# Patient Record
Sex: FEMALE
[De-identification: names, ages, dates, MRNs, and addresses within clinical notes are randomized per-mention and may not be internally consistent; named-entity substitution may affect disease eponyms.]

---

## 2018-03-21 ENCOUNTER — HOSPITAL ENCOUNTER (EMERGENCY)
Dept: HOSPITAL 5 - ED | Age: 33
Discharge: HOME | End: 2018-03-21
Payer: COMMERCIAL

## 2018-03-21 VITALS — DIASTOLIC BLOOD PRESSURE: 53 MMHG | SYSTOLIC BLOOD PRESSURE: 110 MMHG

## 2018-03-21 DIAGNOSIS — N93.8: Primary | ICD-10-CM

## 2018-03-21 DIAGNOSIS — Z98.51: ICD-10-CM

## 2018-03-21 DIAGNOSIS — F17.200: ICD-10-CM

## 2018-03-21 DIAGNOSIS — Z90.49: ICD-10-CM

## 2018-03-21 LAB
BASOPHILS # (AUTO): 0 K/MM3 (ref 0–0.1)
BASOPHILS NFR BLD AUTO: 0.3 % (ref 0–1.8)
EOSINOPHIL # BLD AUTO: 0.2 K/MM3 (ref 0–0.4)
EOSINOPHIL NFR BLD AUTO: 2.1 % (ref 0–4.3)
HCT VFR BLD CALC: 35.6 % (ref 30.3–42.9)
HGB BLD-MCNC: 11.8 GM/DL (ref 10.1–14.3)
LYMPHOCYTES # BLD AUTO: 3.4 K/MM3 (ref 1.2–5.4)
LYMPHOCYTES NFR BLD AUTO: 35.3 % (ref 13.4–35)
MCH RBC QN AUTO: 30 PG (ref 28–32)
MCHC RBC AUTO-ENTMCNC: 33 % (ref 30–34)
MCV RBC AUTO: 91 FL (ref 79–97)
MONOCYTES # (AUTO): 0.7 K/MM3 (ref 0–0.8)
MONOCYTES % (AUTO): 7.2 % (ref 0–7.3)
PLATELET # BLD: 342 K/MM3 (ref 140–440)
RBC # BLD AUTO: 3.9 M/MM3 (ref 3.65–5.03)

## 2018-03-21 PROCEDURE — 99283 EMERGENCY DEPT VISIT LOW MDM: CPT

## 2018-03-21 PROCEDURE — 84703 CHORIONIC GONADOTROPIN ASSAY: CPT

## 2018-03-21 PROCEDURE — 85025 COMPLETE CBC W/AUTO DIFF WBC: CPT

## 2018-03-21 PROCEDURE — 36415 COLL VENOUS BLD VENIPUNCTURE: CPT

## 2018-03-21 NOTE — EMERGENCY DEPARTMENT REPORT
Blank Doc





- Documentation


Documentation: 





Patient is a 32-year-old  female said 8 days of heavy vaginal 

bleeding.  Patient states that she is bleeding to the point where there is 

multiple clots coming out.  Patient states she has some suprapubic and left 

lower quadrant discomfort as well.  Patient states she is normally very regular 

and not had any leading to this level.  Patient's pain is 7 out of 10 in 

severity.  It is a sharp aching pain.  Patient denies any chest pain shortness 

of breath and fatigue.  Patient states she doesn't have another appointment 

with her OB/GYN for 2 weeks.  Patient's OB/GYN sent her here today for 

evaluation.

## 2018-03-21 NOTE — EMERGENCY DEPARTMENT REPORT
ED Female  HPI





- General


Chief complaint: Vaginal Bleeding


Stated complaint: VAG BLEEDING


Time Seen by Provider: 18 17:14


Source: patient


Mode of arrival: Ambulatory


Limitations: No Limitations





- History of Present Illness


Initial comments: 


Patient is a 32-year-old  female said 8 days of heavy vaginal 

bleeding.  Patient states that she is bleeding to the point where there is 

multiple clots coming out.  Patient states she has some suprapubic and left 

lower quadrant discomfort as well.  Patient states she is normally very regular 

and not had any leading to this level.  Patient's pain is 7 out of 10 in 

severity.  It is a sharp aching pain.  Patient denies any chest pain shortness 

of breath and fatigue.  Patient states she doesn't have another appointment 

with her OB/GYN for 2 weeks.  Patient's OB/GYN sent her here today for 

evaluation.








- Related Data


 Previous Rx's











 Medication  Instructions  Recorded  Last Taken  Type


 


HYDROcodone/APAP 5-325 [[place] 1 each PO Q4HR PRN #12 tablet 18 Unknown Rx





5/325]    


 


Ibuprofen [Motrin] 600 mg PO Q8H PRN #20 tablet 18 Unknown Rx


 


medroxyPROGESTERone ACETATE 10 mg PO DAILY #5 tablet 18 Unknown Rx





[Provera]    











 Allergies











Allergy/AdvReac Type Severity Reaction Status Date / Time


 


No Known Allergies Allergy   Unverified 18 14:41














ED Review of Systems


ROS: 


Stated complaint: VAG BLEEDING


Other details as noted in HPI





Comment: All other systems reviewed and negative





ED Past Medical Hx





- Past Medical History


Previous Medical History?: Yes


Additional medical history: Heavy menstrual cycles





- Surgical History


Past Surgical History?: Yes


Hx Cholecystectomy: Yes


Additional Surgical History: Leep procedure, Tubaligation,  x 2





- Social History


Smoking Status: Current Every Day Smoker


Substance Use Type: Alcohol, Non Opiate Pain





- Medications


Home Medications: 


 Home Medications











 Medication  Instructions  Recorded  Confirmed  Last Taken  Type


 


HYDROcodone/APAP 5-325 [[place] 1 each PO Q4HR PRN #12 tablet 18  Unknown Rx





5/325]     


 


Ibuprofen [Motrin] 600 mg PO Q8H PRN #20 tablet 18  Unknown Rx


 


medroxyPROGESTERone ACETATE 10 mg PO DAILY #5 tablet 18  Unknown Rx





[Provera]     














ED Physical Exam





- General


Limitations: No Limitations


General appearance: alert, in no apparent distress





- Head


Head exam: Present: atraumatic, normocephalic





- Eye


Eye exam: Present: normal appearance





- ENT


ENT exam: Present: mucous membranes moist





- Neck


Neck exam: Present: normal inspection





- Respiratory


Respiratory exam: Present: normal lung sounds bilaterally.  Absent: respiratory 

distress





- Cardiovascular


Cardiovascular Exam: Present: regular rate, normal rhythm.  Absent: systolic 

murmur, diastolic murmur, rubs, gallop





- GI/Abdominal


GI/Abdominal exam: Present: soft, normal bowel sounds





- Extremities Exam


Extremities exam: Present: normal inspection





- Back Exam


Back exam: Present: normal inspection





- Neurological Exam


Neurological exam: Present: alert, oriented X3





- Psychiatric


Psychiatric exam: Present: normal affect, normal mood





- Skin


Skin exam: Present: warm, dry, intact, normal color.  Absent: rash





ED Course





 Vital Signs











  18





  14:41


 


Temperature 97.7 F


 


Pulse Rate 104 H


 


Respiratory 20





Rate 


 


Blood Pressure 110/53


 


O2 Sat by Pulse 97





Oximetry 














ED Medical Decision Making





- Lab Data


Result diagrams: 


 18 17:57








- Medical Decision Making





Patient's hemoglobin was within normal limits and she has not lost more blood 

than her body is compensating for.  Patient was started on Provera and given 

something for pain and she'll follow with her OB/GYN.


Critical care attestation.: 


If time is entered above; I have spent that time in minutes in the direct care 

of this critically ill patient, excluding procedure time.








ED Disposition


Clinical Impression: 


 DUB (dysfunctional uterine bleeding)





Disposition: DC-01 TO HOME OR SELFCARE


Is pt being admited?: No


Does the pt Need Aspirin: No


Condition: Stable


Instructions:  Dysfunctional Uterine Bleeding (ED)


Prescriptions: 


medroxyPROGESTERone ACETATE [Provera] 10 mg PO DAILY #5 tablet


Referrals: 


[facility] [Primary Care Provider] - 3-5 Days

## 2019-06-18 ENCOUNTER — HOSPITAL ENCOUNTER (EMERGENCY)
Dept: HOSPITAL 5 - ED | Age: 34
Discharge: HOME | End: 2019-06-18
Payer: SELF-PAY

## 2019-06-18 DIAGNOSIS — S81.812A: Primary | ICD-10-CM
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
## 2019-06-18 DIAGNOSIS — F17.200: ICD-10-CM

## 2019-06-18 DIAGNOSIS — Z90.49: ICD-10-CM
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
## 2019-06-18 DIAGNOSIS — Z98.51: ICD-10-CM
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
## 2019-06-18 PROCEDURE — 90471 IMMUNIZATION ADMIN: CPT

## 2019-06-18 PROCEDURE — 90715 TDAP VACCINE 7 YRS/> IM: CPT

## 2019-06-18 NOTE — EMERGENCY DEPARTMENT REPORT
ED General Adult HPI





- General


Chief complaint: Wound/Laceration


Stated complaint: LT LEG LAC


Time Seen by Provider: 19 14:08


Source: patient


Mode of arrival: Ambulatory


Limitations: No Limitations





- History of Present Illness


Initial comments: 





The patient presents to the emergency department for laceration to her left leg 

that occurred at work.  Patient states that [facility]


-: Sudden


Severity scale (0 -10): 1


Quality: burning


Consistency: constant


Improves with: none


Worsens with: none


Associated Symptoms: denies other symptoms


Treatments Prior to Arrival: none





- Related Data


                                  Previous Rx's











 Medication  Instructions  Recorded  Last Taken  Type


 


HYDROcodone/APAP 5-325 [[place] 1 each PO Q4HR PRN #12 tablet 18 Unknown Rx





5/325]    


 


Ibuprofen [Motrin] 600 mg PO Q8H PRN #20 tablet 18 Unknown Rx


 


medroxyPROGESTERone ACETATE 10 mg PO DAILY #5 tablet 18 Unknown Rx





[Provera]    











                                    Allergies











Allergy/AdvReac Type Severity Reaction Status Date / Time


 


No Known Allergies Allergy   Unverified 18 14:41














ED Review of Systems


ROS: 


Stated complaint: LT LEG LAC


Other details as noted in HPI





Comment: All other systems reviewed and negative


Constitutional: denies: chills, fever


Eyes: denies: eye pain, eye discharge, vision change


ENT: denies: ear pain, throat pain


Respiratory: denies: cough, shortness of breath, wheezing


Cardiovascular: denies: chest pain, palpitations


Endocrine: no symptoms reported


Gastrointestinal: denies: abdominal pain, nausea, diarrhea


Genitourinary: denies: urgency, dysuria, discharge


Musculoskeletal: denies: back pain, joint swelling, arthralgia


Skin: denies: rash, lesions


Neurological: denies: headache, weakness, paresthesias


Psychiatric: denies: anxiety, depression


Hematological/Lymphatic: denies: easy bleeding, easy bruising





ED Past Medical Hx





- Past Medical History


Previous Medical History?: Yes


Additional medical history: Heavy menstrual cycles





- Surgical History


Past Surgical History?: Yes


Hx Cholecystectomy: Yes


Additional Surgical History: Leep procedure, Tubaligation,  x 2





- Social History


Smoking Status: Current Every Day Smoker


Substance Use Type: Alcohol, Non Opiate Pain





- Medications


Home Medications: 


                                Home Medications











 Medication  Instructions  Recorded  Confirmed  Last Taken  Type


 


HYDROcodone/APAP 5-325 [[place] 1 each PO Q4HR PRN #12 tablet 18  Unknown Rx





5/325]     


 


Ibuprofen [Motrin] 600 mg PO Q8H PRN #20 tablet 18  Unknown Rx


 


medroxyPROGESTERone ACETATE 10 mg PO DAILY #5 tablet 18  Unknown Rx





[Provera]     














ED Physical Exam





- General


Limitations: No Limitations


General appearance: alert, in no apparent distress





- Head


Head exam: Present: atraumatic, normocephalic





- Eye


Eye exam: Present: normal appearance





- ENT


ENT exam: Present: mucous membranes moist





- Neck


Neck exam: Present: normal inspection





- Extremities Exam


Extremities exam: Present: other (for similar laceration to the distal aspect of

 the left thigh right)





- Back Exam


Back exam: Present: normal inspection





- Neurological Exam


Neurological exam: Present: alert, oriented X3, CN II-XII intact.  Absent: motor

 sensory deficit





- Psychiatric


Psychiatric exam: Present: normal affect, normal mood





- Skin


Skin exam: Present: warm, dry, intact, normal color.  Absent: rash





- Laceration /Wound Repair


  ** Left Thigh


Wound Location: lower extremity


Wound Length (cm): 4


Wound's Depth, Shape: linear


Wound Explored: clean


Irrigated w/ Saline (ccs): 100


Betadine Prep?: Yes


Wound Repaired With: Dermabond





ED Medical Decision Making





- Medical Decision Making





Tetanus updated


Laceration closed with Dermabond and Steri-Strips


Wound care instructions discussed with patient


Critical care attestation.: 


If time is entered above; I have spent that time in minutes in the direct care 

of this critically ill patient, excluding procedure time.








ED Disposition


Clinical Impression: 


 Laceration





Disposition: DC-01 TO HOME OR SELFCARE


Is pt being admited?: No


Does the pt Need Aspirin: No


Condition: Stable


Instructions:  Laceration (ED), Suture Care (ED)


Additional Instructions: 


return if worse


Referrals: 


[facility] [Primary Care Provider] - 3-5 Days


[facility] [Provider Group] - 3-5 Days


[facility] [Provider Group] - 3-5 Days


Time of Disposition: 14:30